# Patient Record
Sex: FEMALE | Race: BLACK OR AFRICAN AMERICAN | NOT HISPANIC OR LATINO
[De-identification: names, ages, dates, MRNs, and addresses within clinical notes are randomized per-mention and may not be internally consistent; named-entity substitution may affect disease eponyms.]

---

## 2017-01-04 ENCOUNTER — APPOINTMENT (OUTPATIENT)
Dept: RADIOLOGY | Facility: IMAGING CENTER | Age: 47
End: 2017-01-04

## 2017-01-04 ENCOUNTER — OUTPATIENT (OUTPATIENT)
Dept: OUTPATIENT SERVICES | Facility: HOSPITAL | Age: 47
LOS: 1 days | End: 2017-01-04
Payer: COMMERCIAL

## 2017-01-04 DIAGNOSIS — Z00.8 ENCOUNTER FOR OTHER GENERAL EXAMINATION: ICD-10-CM

## 2017-01-04 PROBLEM — Z00.00 ENCOUNTER FOR PREVENTIVE HEALTH EXAMINATION: Status: ACTIVE | Noted: 2017-01-04

## 2017-01-04 PROCEDURE — 71046 X-RAY EXAM CHEST 2 VIEWS: CPT

## 2018-09-24 ENCOUNTER — RESULT REVIEW (OUTPATIENT)
Age: 48
End: 2018-09-24

## 2020-01-14 ENCOUNTER — EMERGENCY (EMERGENCY)
Facility: HOSPITAL | Age: 50
LOS: 1 days | Discharge: HOME | End: 2020-01-14
Attending: EMERGENCY MEDICINE
Payer: COMMERCIAL

## 2020-01-14 VITALS
DIASTOLIC BLOOD PRESSURE: 76 MMHG | RESPIRATION RATE: 16 BRPM | SYSTOLIC BLOOD PRESSURE: 108 MMHG | OXYGEN SATURATION: 98 % | HEART RATE: 73 BPM | TEMPERATURE: 98 F

## 2020-01-14 VITALS
HEIGHT: 69 IN | OXYGEN SATURATION: 97 % | HEART RATE: 87 BPM | SYSTOLIC BLOOD PRESSURE: 118 MMHG | TEMPERATURE: 97 F | RESPIRATION RATE: 18 BRPM | WEIGHT: 195.11 LBS | DIASTOLIC BLOOD PRESSURE: 71 MMHG

## 2020-01-14 LAB
ALBUMIN SERPL ELPH-MCNC: 4.1 G/DL — SIGNIFICANT CHANGE UP (ref 3.3–5)
ALP SERPL-CCNC: 72 U/L — SIGNIFICANT CHANGE UP (ref 40–120)
ALT FLD-CCNC: 21 U/L — SIGNIFICANT CHANGE UP (ref 10–45)
ANION GAP SERPL CALC-SCNC: 14 MMOL/L — SIGNIFICANT CHANGE UP (ref 5–17)
APPEARANCE UR: CLEAR — SIGNIFICANT CHANGE UP
AST SERPL-CCNC: 21 U/L — SIGNIFICANT CHANGE UP (ref 10–40)
BACTERIA # UR AUTO: NEGATIVE — SIGNIFICANT CHANGE UP
BASOPHILS # BLD AUTO: 0.05 K/UL — SIGNIFICANT CHANGE UP (ref 0–0.2)
BASOPHILS NFR BLD AUTO: 0.6 % — SIGNIFICANT CHANGE UP (ref 0–2)
BILIRUB SERPL-MCNC: 0.2 MG/DL — SIGNIFICANT CHANGE UP (ref 0.2–1.2)
BILIRUB UR-MCNC: ABNORMAL
BUN SERPL-MCNC: 16 MG/DL — SIGNIFICANT CHANGE UP (ref 7–23)
CALCIUM SERPL-MCNC: 9.2 MG/DL — SIGNIFICANT CHANGE UP (ref 8.4–10.5)
CHLORIDE SERPL-SCNC: 100 MMOL/L — SIGNIFICANT CHANGE UP (ref 96–108)
CO2 SERPL-SCNC: 25 MMOL/L — SIGNIFICANT CHANGE UP (ref 22–31)
COLOR SPEC: ABNORMAL
CREAT SERPL-MCNC: 0.74 MG/DL — SIGNIFICANT CHANGE UP (ref 0.5–1.3)
DIFF PNL FLD: NEGATIVE — SIGNIFICANT CHANGE UP
EOSINOPHIL # BLD AUTO: 0.05 K/UL — SIGNIFICANT CHANGE UP (ref 0–0.5)
EOSINOPHIL NFR BLD AUTO: 0.6 % — SIGNIFICANT CHANGE UP (ref 0–6)
EPI CELLS # UR: 0 /HPF — SIGNIFICANT CHANGE UP
GAS PNL BLDV: SIGNIFICANT CHANGE UP
GLUCOSE SERPL-MCNC: 100 MG/DL — HIGH (ref 70–99)
GLUCOSE UR QL: NEGATIVE — SIGNIFICANT CHANGE UP
HCT VFR BLD CALC: 37.5 % — SIGNIFICANT CHANGE UP (ref 34.5–45)
HGB BLD-MCNC: 11.5 G/DL — SIGNIFICANT CHANGE UP (ref 11.5–15.5)
HYALINE CASTS # UR AUTO: 0 /LPF — SIGNIFICANT CHANGE UP (ref 0–2)
IMM GRANULOCYTES NFR BLD AUTO: 0.2 % — SIGNIFICANT CHANGE UP (ref 0–1.5)
KETONES UR-MCNC: NEGATIVE — SIGNIFICANT CHANGE UP
LEUKOCYTE ESTERASE UR-ACNC: ABNORMAL
LYMPHOCYTES # BLD AUTO: 3.71 K/UL — HIGH (ref 1–3.3)
LYMPHOCYTES # BLD AUTO: 43.9 % — SIGNIFICANT CHANGE UP (ref 13–44)
MCHC RBC-ENTMCNC: 26.5 PG — LOW (ref 27–34)
MCHC RBC-ENTMCNC: 30.7 GM/DL — LOW (ref 32–36)
MCV RBC AUTO: 86.4 FL — SIGNIFICANT CHANGE UP (ref 80–100)
MONOCYTES # BLD AUTO: 0.68 K/UL — SIGNIFICANT CHANGE UP (ref 0–0.9)
MONOCYTES NFR BLD AUTO: 8 % — SIGNIFICANT CHANGE UP (ref 2–14)
NEUTROPHILS # BLD AUTO: 3.95 K/UL — SIGNIFICANT CHANGE UP (ref 1.8–7.4)
NEUTROPHILS NFR BLD AUTO: 46.7 % — SIGNIFICANT CHANGE UP (ref 43–77)
NITRITE UR-MCNC: POSITIVE
NRBC # BLD: 0 /100 WBCS — SIGNIFICANT CHANGE UP (ref 0–0)
PH UR: 6 — SIGNIFICANT CHANGE UP (ref 5–8)
PLATELET # BLD AUTO: 266 K/UL — SIGNIFICANT CHANGE UP (ref 150–400)
POTASSIUM SERPL-MCNC: 4.1 MMOL/L — SIGNIFICANT CHANGE UP (ref 3.5–5.3)
POTASSIUM SERPL-SCNC: 4.1 MMOL/L — SIGNIFICANT CHANGE UP (ref 3.5–5.3)
PROT SERPL-MCNC: 7.1 G/DL — SIGNIFICANT CHANGE UP (ref 6–8.3)
PROT UR-MCNC: NEGATIVE — SIGNIFICANT CHANGE UP
RBC # BLD: 4.34 M/UL — SIGNIFICANT CHANGE UP (ref 3.8–5.2)
RBC # FLD: 14.8 % — HIGH (ref 10.3–14.5)
RBC CASTS # UR COMP ASSIST: 5 /HPF — HIGH (ref 0–4)
SODIUM SERPL-SCNC: 139 MMOL/L — SIGNIFICANT CHANGE UP (ref 135–145)
SP GR SPEC: 1.02 — SIGNIFICANT CHANGE UP (ref 1.01–1.02)
UROBILINOGEN FLD QL: ABNORMAL
WBC # BLD: 8.46 K/UL — SIGNIFICANT CHANGE UP (ref 3.8–10.5)
WBC # FLD AUTO: 8.46 K/UL — SIGNIFICANT CHANGE UP (ref 3.8–10.5)
WBC UR QL: 17 /HPF — HIGH (ref 0–5)

## 2020-01-14 PROCEDURE — 81001 URINALYSIS AUTO W/SCOPE: CPT

## 2020-01-14 PROCEDURE — 81025 URINE PREGNANCY TEST: CPT

## 2020-01-14 PROCEDURE — 87086 URINE CULTURE/COLONY COUNT: CPT

## 2020-01-14 PROCEDURE — 82435 ASSAY OF BLOOD CHLORIDE: CPT

## 2020-01-14 PROCEDURE — 82803 BLOOD GASES ANY COMBINATION: CPT

## 2020-01-14 PROCEDURE — 51702 INSERT TEMP BLADDER CATH: CPT

## 2020-01-14 PROCEDURE — 74177 CT ABD & PELVIS W/CONTRAST: CPT

## 2020-01-14 PROCEDURE — 87491 CHLMYD TRACH DNA AMP PROBE: CPT

## 2020-01-14 PROCEDURE — 82330 ASSAY OF CALCIUM: CPT

## 2020-01-14 PROCEDURE — 87040 BLOOD CULTURE FOR BACTERIA: CPT

## 2020-01-14 PROCEDURE — 83605 ASSAY OF LACTIC ACID: CPT

## 2020-01-14 PROCEDURE — 82947 ASSAY GLUCOSE BLOOD QUANT: CPT

## 2020-01-14 PROCEDURE — 87591 N.GONORRHOEAE DNA AMP PROB: CPT

## 2020-01-14 PROCEDURE — 85027 COMPLETE CBC AUTOMATED: CPT

## 2020-01-14 PROCEDURE — 99284 EMERGENCY DEPT VISIT MOD MDM: CPT | Mod: 25

## 2020-01-14 PROCEDURE — 84295 ASSAY OF SERUM SODIUM: CPT

## 2020-01-14 PROCEDURE — 84132 ASSAY OF SERUM POTASSIUM: CPT

## 2020-01-14 PROCEDURE — 80053 COMPREHEN METABOLIC PANEL: CPT

## 2020-01-14 PROCEDURE — 74177 CT ABD & PELVIS W/CONTRAST: CPT | Mod: 26

## 2020-01-14 PROCEDURE — 99285 EMERGENCY DEPT VISIT HI MDM: CPT

## 2020-01-14 PROCEDURE — 85014 HEMATOCRIT: CPT

## 2020-01-14 RX ORDER — CEFPODOXIME PROXETIL 100 MG
1 TABLET ORAL
Qty: 20 | Refills: 0
Start: 2020-01-14 | End: 2020-01-23

## 2020-01-14 RX ORDER — PHENAZOPYRIDINE HCL 100 MG
100 TABLET ORAL ONCE
Refills: 0 | Status: COMPLETED | OUTPATIENT
Start: 2020-01-14 | End: 2020-01-14

## 2020-01-14 RX ORDER — CEFPODOXIME PROXETIL 100 MG
200 TABLET ORAL ONCE
Refills: 0 | Status: COMPLETED | OUTPATIENT
Start: 2020-01-14 | End: 2020-01-14

## 2020-01-14 RX ADMIN — Medication 200 MILLIGRAM(S): at 16:58

## 2020-01-14 RX ADMIN — Medication 100 MILLIGRAM(S): at 15:50

## 2020-01-14 RX ADMIN — Medication 100 MILLIGRAM(S): at 06:53

## 2020-01-14 NOTE — ED PROVIDER NOTE - PROGRESS NOTE DETAILS
Admits to suprapubic pain x3 months, worsened last night. UA +, pending UCX. Admits to persistent bubbles in urine, concerning for vesicovaginal fistual; voiding cystourethrogram ordered; urology consulted Olga Lidia Cotter M.D. Resident  Patient signed out to me pending voiding cystourethrogram. Spoke to rads resident regarding test; rads resident will call back. Olga Lidia Cotter M.D. Resident  Spoke to urology regarding CT pelvis Cystogram, will give recs after pt is seen. Olga Lidia Cotter M.D. Resident  Urology attending will see patient. endorsed to me by ray. still waiting for voiding cystogram. pt has decided she will go ahead w dayami Griffin MD- Olga Lidia Cotter M.D. Resident  Urology has low suspicion for fistula, recommends outpatient follow up with no further testing at this time. Pt very distressed about symptoms, offered Xray voiding urethrogram. Pt needs more time to decide if she would like to proceed with xray urethrogram. Olga Lidia Cotter M.D. Resident  Patient declined xray urethrogram stating it is used to r/o reflux. CT cystogram ordered. called radiology to expedite. Olga Lidia Cotter M.D. Resident  Patient declined xray urethrogram stating it is used to r/o reflux. CT cystogram ordered. called radiology to expedite. Instructed to call CT tech to expedite. Olga Lidia Cotter M.D. Resident  Patient in room getting drake cath. ED CT called to expedite (with clearance from radiology resident Roya). CT said they will try to get done asap. Olga Lidia Cotter M.D. Resident  Spoke to CT scan, cardiac room is busy and no room in upstairs CT scan. will take PT as soon as possible when cardiac room is open. pt endorsed to me by dr ray whalen md. dw her that there were many delays due to original plan of xr cystogram - changed to ct urethrogram. uro did state that it was poss to do this outpt  but she wants it done now. delay still due to need for an open table CT for lenghty test. she understands this will require further waiting. will endorse to shital figueroa. -Joe Griffin MD- Olga Lidia Cotter M.D. Resident  results reviewed with patient. Patient agreeable to follow up with urology and home on cefpodoxime. Rx sent. written and verbal discharge instructions and return precautions given to patient. Olga Lidia Cotter M.D. Resident  Patient draek removed and pt was able to void post drake. Pt agreeable to going home.

## 2020-01-14 NOTE — ED PROVIDER NOTE - NSFOLLOWUPCLINICS_GEN_ALL_ED_FT
[facility]  Urology  [address], [address]  [place] [address]  Phone: [phone]  Fax:   Follow Up Time: 4-6 Days    [facility]  Urology  [address], [address]rd [place] [address]  Phone: [phone]  Fax:   Follow Up Time: 4-6 Days    [facility] - Urology  Urology  [address], 3rd & [address]th [place] [address]  Phone: [phone]  Fax:   Follow Up Time: 4-6 Days    [facility]  Urology  [address] [place] [address]  Phone: [phone]  Fax:   Follow Up Time: 4-6 Days    [facility]  Urology  [address] [place] [address]  Phone: [phone]  Fax: [phone]  Follow Up Time: 4-6 Days    [facility]  Urology  [address] [place] [address]  Phone: [phone]  Fax:   Follow Up Time: 4-6 Days

## 2020-01-14 NOTE — CONSULT NOTE ADULT - SUBJECTIVE AND OBJECTIVE BOX
HPI:    Patient is a 49 y.o F w/ PMH ovarian ca (dx age 21) s/p total hysterectomy and chemotherapy who presents w/ increasing suprapubic pain and increased urinary frequency x3 months. She admits to "bubbles" in her urine and passing air per urethra. Her symptoms are partially relieved w/ pyridium however she came to ER because the pyridium was not helping any longer.  She denies fever/chills, hematuria. She was dx w/ UTI in October and had symptoms of dysuria, increased urinary frequency, and surapubic pain. She was treated w/ macrobid and her dysuria resolved. However her suprapubic pain and urinary frequency persisted. She presented to her PMD and gynecology who found her subsequent urine cultures to be negative and no signs of prolapse. Pelvic exams x 2 were negative as was a pelvic sono.  She was referred to uro-gyn and scheduled to have an appt in February. However, her pain worsened yesterday which brought her to the ED. She denies constipation of diarrhea.  	          PAST MEDICAL & SURGICAL HISTORY:  Ovarian cancer    MEDICATIONS  (STANDING):  none    MEDICATIONS  (PRN): Pyridium    FAMILY HISTORY:    Allergies    penicillin (Hives; Pruritus)    Intolerances      SOCIAL HISTORY:   Tobacco hx: unknown      REVIEW OF SYSTEMS: Pertinent positives and negatives as stated in HPI, otherwise negative    Vital signs  T(C): 36.7 (20 @ 07:20), Max: 36.7 (20 @ 07:20)  HR: 75 (20 @ 07:20)  BP: 110/73 (20 @ 07:20)  SpO2: 97% (20 @ 07:20)  Wt(kg): --    Output    UOP    Physical Exam  Gen: NAD  Pulm: No respiratory distress, no subcostal retractions  CV: RRR, no JVD  Abd: Soft, NT, ND  Back: No CVAT   MSK: No edema present    LABS:           @ 05:51    WBC 8.46  / Hct 37.5  / SCr 0.74         139  |  100  |  16  ----------------------------<  100<H>  4.1   |  25  |  0.74    Ca    9.2      2020 05:51    TPro  7.1  /  Alb  4.1  /  TBili  0.2  /  DBili  x   /  AST  21  /  ALT  21  /  AlkPhos  72  01-14      Urinalysis Basic - ( 2020 04:12 )    Color: Dark Orange / Appearance: Clear / S.022 / pH: x  Gluc: x / Ketone: Negative  / Bili: Small / Urobili: 4 mg/dL   Blood: x / Protein: Negative / Nitrite: Positive   Leuk Esterase: Large / RBC: 5 /hpf / WBC 17 /HPF   Sq Epi: x / Non Sq Epi: 0 /hpf / Bacteria: Negative      RADIOLOGY:

## 2020-01-14 NOTE — ED ADULT NURSE REASSESSMENT NOTE - NS ED NURSE REASSESS COMMENT FT1
Addendum note for 12:45pm:  Pt for cystogram, as per Dr. Cotter, pt needs Rose catheter prior to cystogram.  Pt was informed by MD, pt requesting small size Rose 12 Sudanese.  Dr. Cotter was informed and pt okay to have 12 Sierra Leonean size.  Rose catheter 12 Sierra Leonean inserted using sterile technique, pt tolerated well.   Pt draining yellow urine.

## 2020-01-14 NOTE — ED PROVIDER NOTE - NSFOLLOWUPINSTRUCTIONS_ED_ALL_ED_FT
You were seen in the Emergency Department (ED) for persistent dysuria and concern for fistula. Your CT was negative for fistula.     You are prescribed Cefpodoxime. Please take as directed by your pharmacist.    Please follow up with urology Dr. Live (call  to make an appointment).     Please return to the ED if you experience any new or concerning symptoms, such as: worsening dysuria, abdominal pain, CVA tenderness, fever, chills.     Thank you for choosing a [facility] ED.

## 2020-01-14 NOTE — ED PROVIDER NOTE - OBJECTIVE STATEMENT
Patient is a 49 y.o F w/ PMH Patient is a 49 y.o F w/ [facility] kidney stones who was sent by her Urologist for a fever of 101.4. She denies any hematuria, dysuria, or change in urinary frequency. Patient states that she had a kidney stone three weeks ago s/p ureteral stent. She underwent a stent removal by her Urologist, Dr. Smith, yesterday (1/13). She denies any complications and was sent home. She subsequently experienced fever/chills and called her Urologist who sent her to the ED. She denies any cough, CP, SOB, abd pain, diarrhea, or skin rashes. Patient is a 49 y.o F w/ PMH ovarian ca (dx age 21) s/p total hysterectomy and chemotherapy who presents w/ suprapubic pain and increased urinary frequency x3 months. She admits to "bubbles" in her urine. Her symptoms are partially relieved w/ pyridium.  She denies fever/chills, hematuria. She states that in November, she was dx w/ UTI and had symptoms of dysuria, increased urinary frequency, and surapubic pain. She was treated w/ macrobid and her dysuria resolved. her suprapubic pain and urinary frequency persisted. She presented to her PMD and gynecology who found her subsequent urine cultures to be negative and no signs of prolapse. She was referred to uro-gyn and scheduled to have an apopitnment in February. However, her pain worsened yesterday which brought her to the ED.

## 2020-01-14 NOTE — ED ADULT NURSE REASSESSMENT NOTE - NS ED NURSE REASSESS COMMENT FT1
Pt is breathing unlabored on RA. VSS. Educated pt on plan of care. Safety and comfort maintained. MD Anderson at pt bedside discussing plan of care.

## 2020-01-14 NOTE — ED ADULT TRIAGE NOTE - CHIEF COMPLAINT QUOTE
pelvic pain intermittent since October; rx for UTI; has urinary frequency; has been unable to f/u with urogynecologist

## 2020-01-14 NOTE — ED ADULT NURSE REASSESSMENT NOTE - NS ED NURSE REASSESS COMMENT FT1
Addendum note:  Rose discontinued/ removed at about 17:10pm as per Dr. Cotter.  Pt ambulatory, pt voiding urine.  MD informed.

## 2020-01-14 NOTE — ED ADULT NURSE REASSESSMENT NOTE - NS ED NURSE REASSESS COMMENT FT1
Received report from night nurse Shagufta Gresham.  Pt resting in stretcher.  No acute respiratory distress noted, v/s obtained.

## 2020-01-14 NOTE — ED PROVIDER NOTE - PHYSICAL EXAMINATION
PHYSICAL EXAM:  GENERAL: NAD, well-developed  HEAD:  Atraumatic, Normocephalic  EYES: EOMI, PERRLA, conjunctiva and sclera clear  NECK: Supple, No JVD  CHEST/LUNG: Clear to auscultation bilaterally; No wheeze  HEART: Regular rate and rhythm; No murmurs, rubs, or gallops  ABDOMEN: no suprapubic tenderness, Soft, Nontender, Nondistended; Bowel sounds present  EXTREMITIES:  2+ Peripheral Pulses, No clubbing, cyanosis, or edema  PSYCH: AAOx3  NEUROLOGY: non-focal  SKIN: No rashes or lesions

## 2020-01-14 NOTE — CONSULT NOTE ADULT - ASSESSMENT
Pelvic pain, air in urine  - send urine culture  - + nitrite in UA likely due to patient taking pyridium   - Unclear source of pelvic pain, fistula unlikely, would defer to Urogyn, and pain control as needed ( i.e. Motrin)  - Can have imaging/ workup of pneumaturia as outpatient  - follow up with UroGYN  - can f/u with Urology Dr. Live [phone]

## 2020-01-14 NOTE — ED PROVIDER NOTE - NS ED ROS FT
GENERAL: No fever, chills  EYES: no vision changes, no discharge.   HEENT: no difficulty swallowing or speaking   CARDIAC: no chest pain/pressure, SOB, lower ex edema  PULMONARY: no cough, SOB  GI: no abdominal pain, n/v/d  : + dysuria, + urinary frequency, no hematuria + bubbles in urine  SKIN: no rashes  NEURO: no headache, lightheadedness, paresthesia  MSK: No joint pain, myalgia, weakness.

## 2020-01-14 NOTE — ED PROVIDER NOTE - CLINICAL SUMMARY MEDICAL DECISION MAKING FREE TEXT BOX
Patient is a 49 y.o F w/ PMH ovarian ca (dx age 21) s/p total hysterectomy and chemotherapy who presents w/ suprapubic pain and increased urinary frequency x3 months. She also admits to "bubbles" in her urine, concerning for vesicovaginal fistula. UA + but per pt, UCX consistently negative in past despite positive UAs. Urology consulted Patient is a 49 y.o F w/ PMH ovarian ca (dx age 21) s/p total hysterectomy and chemotherapy who presents w/ suprapubic pain and increased urinary frequency x3 months. She also admits to "bubbles" in her urine, concerning for vesicovaginal fistula. UA + but per pt, UCX consistently negative in past despite positive UAs. Urology consulted    KALIN Anderson MD: Agree with statement above. Ddx includes, however, is not limited to: rectovaginal fistula, interstitial cystitis, uti, other. Plan: basic labs, u/a, urology consult. Pt has a f/u appt with Urogyn almost 2 months from now, will initiate w/u today

## 2020-01-14 NOTE — ED ADULT NURSE NOTE - OBJECTIVE STATEMENT
Pt is 49 Y A&O x3 F with no PMH presenting to ED with c/o intermittent pelvic pain since October that has gotten progressively worse over past few days. Pt reports she was treated for UTI in October, pain has not resolved. Pt reports she has been taking Tylenol/Motrin with no relief.  Pt reports she has been taking Pyridium prescribed by PCP with some relief. Pt states she has appointment in February to f/u with urogynecologist. Pt endorses urinary frequency, denies burning upon urination. Pt denies fevers, chills, CP, SOB. Pt arrives to ED breathing unlabored on RA. Abd soft, non-distended, non-tender. Skin is warm and dry. Pt ambulatory with steady gait. Safety and comfort maintained. Awaiting MD evaluation.

## 2020-01-14 NOTE — ED PROVIDER NOTE - PATIENT PORTAL LINK FT
You can access the FollowMyHealth Patient Portal offered by [facility] by registering at the following website: [email or web address]. By joining [email or web address] FollowMyHealth portal, you will also be able to view your health information using other applications (apps) compatible with our system.

## 2020-01-15 LAB
C TRACH RRNA SPEC QL NAA+PROBE: SIGNIFICANT CHANGE UP
CULTURE RESULTS: SIGNIFICANT CHANGE UP
N GONORRHOEA RRNA SPEC QL NAA+PROBE: SIGNIFICANT CHANGE UP
SPECIMEN SOURCE: SIGNIFICANT CHANGE UP
SPECIMEN SOURCE: SIGNIFICANT CHANGE UP

## 2020-01-19 LAB
CULTURE RESULTS: SIGNIFICANT CHANGE UP
CULTURE RESULTS: SIGNIFICANT CHANGE UP
SPECIMEN SOURCE: SIGNIFICANT CHANGE UP
SPECIMEN SOURCE: SIGNIFICANT CHANGE UP

## 2020-01-29 ENCOUNTER — APPOINTMENT (OUTPATIENT)
Dept: UROGYNECOLOGY | Facility: CLINIC | Age: 50
End: 2020-01-29
Payer: COMMERCIAL

## 2020-01-29 VITALS
HEIGHT: 68 IN | SYSTOLIC BLOOD PRESSURE: 119 MMHG | BODY MASS INDEX: 29.1 KG/M2 | DIASTOLIC BLOOD PRESSURE: 77 MMHG | WEIGHT: 192 LBS

## 2020-01-29 DIAGNOSIS — Z83.3 FAMILY HISTORY OF DIABETES MELLITUS: ICD-10-CM

## 2020-01-29 DIAGNOSIS — Z85.40 PERSONAL HISTORY OF MALIGNANT NEOPLASM OF UNSPECIFIED FEMALE GENITAL ORGAN: ICD-10-CM

## 2020-01-29 DIAGNOSIS — Z78.9 OTHER SPECIFIED HEALTH STATUS: ICD-10-CM

## 2020-01-29 DIAGNOSIS — Z87.19 PERSONAL HISTORY OF OTHER DISEASES OF THE DIGESTIVE SYSTEM: ICD-10-CM

## 2020-01-29 DIAGNOSIS — Z80.3 FAMILY HISTORY OF MALIGNANT NEOPLASM OF BREAST: ICD-10-CM

## 2020-01-29 DIAGNOSIS — Z85.43 PERSONAL HISTORY OF MALIGNANT NEOPLASM OF OVARY: ICD-10-CM

## 2020-01-29 DIAGNOSIS — Z82.69 FAMILY HISTORY OF OTHER DISEASES OF THE MUSCULOSKELETAL SYSTEM AND CONNECTIVE TISSUE: ICD-10-CM

## 2020-01-29 DIAGNOSIS — Z87.2 PERSONAL HISTORY OF DISEASES OF THE SKIN AND SUBCUTANEOUS TISSUE: ICD-10-CM

## 2020-01-29 LAB
BILIRUB UR QL STRIP: NEGATIVE
CLARITY UR: CLEAR
COLLECTION METHOD: NORMAL
GLUCOSE UR-MCNC: NEGATIVE
HCG UR QL: 0.2 EU/DL
HGB UR QL STRIP.AUTO: NEGATIVE
KETONES UR-MCNC: NEGATIVE
LEUKOCYTE ESTERASE UR QL STRIP: NORMAL
NITRITE UR QL STRIP: NEGATIVE
PH UR STRIP: 6.5
PROT UR STRIP-MCNC: NEGATIVE
SP GR UR STRIP: 1.01

## 2020-01-29 PROCEDURE — 81003 URINALYSIS AUTO W/O SCOPE: CPT | Mod: QW

## 2020-01-29 PROCEDURE — 99204 OFFICE O/P NEW MOD 45 MIN: CPT | Mod: 25

## 2020-01-29 PROCEDURE — 51701 INSERT BLADDER CATHETER: CPT

## 2020-01-29 RX ORDER — MULTIVITAMIN
TABLET ORAL
Refills: 0 | Status: ACTIVE | COMMUNITY

## 2020-01-29 RX ORDER — PHENAZOPYRIDINE 100 MG/1
100 TABLET, FILM COATED ORAL
Refills: 0 | Status: ACTIVE | COMMUNITY

## 2020-01-29 RX ORDER — ACETAMINOPHEN 325 MG/1
TABLET, FILM COATED ORAL
Refills: 0 | Status: ACTIVE | COMMUNITY

## 2020-01-29 RX ORDER — PSYLLIUM HUSK 0.4 G
CAPSULE ORAL
Refills: 0 | Status: ACTIVE | COMMUNITY

## 2020-01-29 RX ORDER — IBUPROFEN 800 MG
800 TABLET ORAL
Refills: 0 | Status: ACTIVE | COMMUNITY

## 2020-01-29 NOTE — HISTORY OF PRESENT ILLNESS
[FreeTextEntry1] : Since 1 UCx-proven UTI in 10/2019, symptoms of urinary freq urge and nocturia, "pelvic" ie suprapubic pain, and vaginal pain which she reports more of flares and spasms/tightness and vaginal pain in relation to pelvic exams that have been occurring in past few months. Gross hematuria once during the initial 10/19 UTIs. UCxs tested thereafter she reports neg, however persistant symptoms of urge freq nocturia. No specific dysuria. No flank pain. No fever/chills, no vag discharge or bleeding, not sexually active, nnormal bowel movements without straining or constipatin with current fiber regimen. Has had multiple Ucxs recently that are neg. Feels passage of gas/air per urine as well, not per vagina, no leakage vaginally, no vag bulge or pressure or discharge, no brown urine passage. Pyridium used, only thing sthat helps slightly. \par \par Plevic exams recently very painful, cannot tolerate. \par \par Hx SLE\par Hx ovar cancer age 21, s/p ZAIDA/BSO and chemo (no RT), was on oral HRT tehreafter until about 10 years ago, never on vag estrogen topically, has been cleared and discmissed from her gyn oncologist. \par \par CT AP with contrast 1/202 report brought in - normal kidneys and bladder, no enterovesical fistula noted, no pathology.

## 2020-01-29 NOTE — ASSESSMENT
[FreeTextEntry1] : Janet is a pleasant 50 yo G0, PMhx/PSHx includes ZAIDA/BSO age 21 for ovarian cancer, s/p chemo (no RT), s/p oral HRT for years until about 10 years ago, now presents with OAB-dry symptoms, vaginal pain, and symptoms of pneumaturia, recent CT A/P neg for path findings. On exam, her empty supine CST was neg, and she did not have positive urethral hypermobility. Her straight-cath PVR was checked 1 hour post-void and was 100 ml, the dip was sent for cytology. Pelvic exam was limited due to pain from likely excessive atrophic vaginal changes - the introitus was narrowed, erythematous without lesions or messes, and tender to soft touch with finger and q-tip. Small speculum exam not attempted due to tenderness. 1 finger digitual exam was performed, able to insert 1 finger about 1/3 into distal vagina, likely bilateral levator tenderness and thickening however exam limited at that point. There wre no appreiable cysts, porlapse, or suburethral diverticulum on distal vaginal exam. \par \par We discussed need for cysto despite neg CT for enterovesical fistula,d ue to symptom of pnematuria, and recnet oncent of urge freq nocturia despite neg ucxs. She understood and agreed. Considering how difficult it is for her to tolerate external vaginal exam alone, I doubt she can undergo office diagnostic cysto. In setting of need for biopsy or further vesicular surgical intervention if any pathology found would be per urology, I recommend she see urology for the cysto in order to minimize the potential for repeat cysto.\par \par My vaginal exam was limited, but she has had the g yn exams without findings recently. She definitely has atrophic vaginal changes, with narrowed introitus, not allowing for touch and unlikely to be able to undergo further interventions such as pelvic floor PT in its curent state. I recommend she see a Gyn Onc for clearance for use of topical vaginal estrogen. We discussed OTC vag moisturizer as well. We discussed vulvodynia and pelvic floor tension myalgia however I am uncertain to what degrees these are present on limited exam, and I feel management optinos which were briefly discussed and written down would be limited by atrophic changes (such as vaginal PT), intravaginal suppositories or creams. She will pursue urology and gyn onc first, and then reevalulate. All ques answered. F/U as needed.\par \par Plan:\par [] f/u urology - cysto - eval for enterovesicular fistula prn\par [] f/u urine cytol\par [] f/u with gyn onc for topical vaginal estrogen clearance --> atrophy management --> assess and address vulvodynia and/or pelvic floor tension myalgia\par

## 2020-01-29 NOTE — PHYSICAL EXAM
[Oriented x3] : oriented to person, place, and time [No Acute Distress] : in no acute distress [No Edema] : ~T edema was not present [Symmetrical] : the neck was ~L symmetrical [None] : no CVA tenderness [Soft, Nontender] : the abdomen was soft and nontender [Warm and Dry] : was warm and dry to touch [Normal Gait] : gait was normal [Labia Minora] : were normal [Labia Majora] : were normal [Bartholin's Gland] : both Bartholin's glands were normal  [Atrophy] : atrophy [No Bleeding] : there was no active vaginal bleeding [Normal] : was normal [Aa ____] : Aa [unfilled] [Ba ____] : Ba [unfilled] [GH ____] : GH [unfilled] [PB ____] : PB [unfilled] [TVL ____] : TVL  [unfilled] [Ap ____] : Ap [unfilled] [Bp ____] : Bp [unfilled] [Post Void Residual ____ml] : post void residual via catheterization was [unfilled] ml [Exam Deferred] : was deferred [Cough] : no cough [Tenderness] : ~T no ~M abdominal tenderness observed [Distended] : not distended [Inguinal LAD] : no adenopathy was noted in the inguinal lymph nodes [FreeTextEntry4] : external visualization normal. q-tip +tenderness to touch vestibule entirely. cannot tolerate narrow speculum exam. 1 digital finger exam no path noted about 1/3 depth of distal vag vault, ? unclear if bilateral levators tender or thickened, extremely painful for patient therefore exam discontinued at this time [FreeTextEntry3] : extremely atrophic, retracted, erythematous but no lesion / mass [de-identified] : no POP [de-identified] : cath painful per patient

## 2020-01-29 NOTE — OB HISTORY
[Total Preg ___] : : [unfilled] [Approximately ___ (Month)] : the LMP was approximately [unfilled] month(s) ago [Sexually Active] : is not sexually active

## 2020-01-29 NOTE — PROCEDURE
[Straight Catheterization] : insertion of a straight catheter [Hematuria] : hematuria [Urinary Tract Infection] : a urinary tract infection [Urinary Frequency] : urinary frequency [Patient] : the patient [___ Fr Straight Tip] : a [unfilled] in Spanish straight tip catheter [None] : none [Clear] : clear [Cytology] : cytology [No Complications] : no complications [Tolerated Well] : the patient tolerated the procedure well [Post procedure instructions and information given] : Post procedure instructions and information were given and reviewed with patient. [3] : 3 [FreeTextEntry1] : cath dip neg - cathed for PVR and uncontam specimen

## 2020-01-30 ENCOUNTER — RESULT REVIEW (OUTPATIENT)
Age: 50
End: 2020-01-30

## 2020-01-30 LAB
APPEARANCE: CLEAR
BACTERIA: NEGATIVE
BILIRUBIN URINE: NEGATIVE
BLOOD URINE: NEGATIVE
COLOR: YELLOW
GLUCOSE QUALITATIVE U: NEGATIVE
HYALINE CASTS: 0 /LPF
KETONES URINE: NEGATIVE
LEUKOCYTE ESTERASE URINE: NEGATIVE
MICROSCOPIC-UA: NORMAL
NITRITE URINE: NEGATIVE
PH URINE: 7.5
PROTEIN URINE: NEGATIVE
RED BLOOD CELLS URINE: 0 /HPF
SPECIFIC GRAVITY URINE: 1.01
SQUAMOUS EPITHELIAL CELLS: 0 /HPF
URINE CYTOLOGY: NORMAL
UROBILINOGEN URINE: NORMAL
WHITE BLOOD CELLS URINE: 0 /HPF

## 2020-01-31 LAB — BACTERIA UR CULT: NORMAL

## 2020-02-10 ENCOUNTER — APPOINTMENT (OUTPATIENT)
Dept: UROGYNECOLOGY | Facility: CLINIC | Age: 50
End: 2020-02-10
Payer: COMMERCIAL

## 2020-02-10 DIAGNOSIS — N95.2 POSTMENOPAUSAL ATROPHIC VAGINITIS: ICD-10-CM

## 2020-02-10 DIAGNOSIS — R39.15 URGENCY OF URINATION: ICD-10-CM

## 2020-02-10 PROBLEM — C56.9 MALIGNANT NEOPLASM OF UNSPECIFIED OVARY: Chronic | Status: ACTIVE | Noted: 2020-01-14

## 2020-02-10 PROCEDURE — 99215 OFFICE O/P EST HI 40 MIN: CPT

## 2020-02-10 RX ORDER — AMITRIPTYLINE HYDROCHLORIDE 10 MG/1
10 TABLET, FILM COATED ORAL
Qty: 30 | Refills: 3 | Status: ACTIVE | COMMUNITY
Start: 2020-02-10 | End: 1900-01-01

## 2020-02-10 RX ORDER — ESTRADIOL 10 UG/1
10 TABLET, FILM COATED VAGINAL
Qty: 24 | Refills: 3 | Status: ACTIVE | COMMUNITY
Start: 2020-02-10 | End: 1900-01-01

## 2020-02-10 NOTE — HISTORY OF PRESENT ILLNESS
[FreeTextEntry1] : \jodi Gonzales presents to my office after seeing Dr Montano with symptoms of urinary frequency, urgency, nocturia, pneumaturia, pelvic pain which started after having a UTI in October. She reports pain is burning sensation and occurs daily. She reports at time it is severe and feels as though it is "flaring". She has had some relief with pyridium in past. She also reports severe vaginal pain in relation to pelvic exams that have been occurring in past few months. During today's visit we discussed performing pelvic examination to further evaluate her symptoms. She became very upset and asked for some time to think about whether or not she agrees to examination today. I counseled her that an examination was necessary as part of her evaluation and that I don’t think I can give her my opinion or assess her complaints without examining her. She is unsure if she would like to proceed. She expressed frustration as she has seen GYN, Urogyn and urology (In ER) and feels that her concerns and symptoms have not been adequately addressed. After 10 minutes of consideration, she agreed to exam. \par \par Hx SLE\par Hx ovar cancer age 21, s/p ZAIDA/BSO and chemo (no RT), was on oral HRT until about 10 years ago, never on vag estrogen topically\par \par CT AP with contrast 1/2020- normal kidneys and bladder, no enterovesical fistula noted, no pathology.

## 2020-02-10 NOTE — PHYSICAL EXAM
[No Acute Distress] : in no acute distress [Normal Memory] : ~T memory was ~L unimpaired [Oriented x3] : oriented to person, place, and time [Normal Mood/Affect] : mood and affect are normal [Normal Gait] : gait was normal [Warm and Dry] : was warm and dry to touch [Vulvar Atrophy] : vulvar atrophy [Labia Minora] : were normal [Labia Majora] : were normal [Normal] : was normal [Atrophy] : atrophy [Dry Mucosa] : dry mucosa [No Bleeding] : there was no active vaginal bleeding [Absent] : absent [Exam Deferred] : was deferred [Tenderness] : ~T no ~M abdominal tenderness observed [Distended] : not distended [Inguinal LAD] : no adenopathy was noted in the inguinal lymph nodes [H/Smegaly] : no hepatosplenomegaly [de-identified] : tenderness on exam, no masses palpated [FreeTextEntry4] : severe atrophy

## 2020-02-10 NOTE — DISCUSSION/SUMMARY
[Face to Face Time___ Minutes] : with [unfilled] minutes in face to face consultation. [FreeTextEntry1] : \par -Cystourethroscopy\par -Start bladder sensitive diet, written guidelines provided\par -Start Amitriptyline 10 mg QHS. Risks and side effects reviewed. If no improvement after 2 weeks, increase to 20 mg QHS\par -Start low dose vaginal estrogen. Risks reviewed. We reviewed available data on low dose vaginal estrogen. Recommend she start Vagifem 10 mcg QHS x 2 weeks then biw\par -Recommend consultation with Fatuma Pop, contact information provided. Possible PFD, IC/BPS.

## 2020-03-06 ENCOUNTER — APPOINTMENT (OUTPATIENT)
Dept: UROLOGY | Facility: CLINIC | Age: 50
End: 2020-03-06
Payer: COMMERCIAL

## 2020-03-06 VITALS
OXYGEN SATURATION: 97 % | TEMPERATURE: 98.1 F | HEART RATE: 78 BPM | BODY MASS INDEX: 29.1 KG/M2 | SYSTOLIC BLOOD PRESSURE: 109 MMHG | DIASTOLIC BLOOD PRESSURE: 77 MMHG | WEIGHT: 192 LBS | RESPIRATION RATE: 14 BRPM | HEIGHT: 68 IN

## 2020-03-06 DIAGNOSIS — R35.1 NOCTURIA: ICD-10-CM

## 2020-03-06 DIAGNOSIS — N94.819 VULVODYNIA, UNSPECIFIED: ICD-10-CM

## 2020-03-06 DIAGNOSIS — R10.2 PELVIC AND PERINEAL PAIN: ICD-10-CM

## 2020-03-06 DIAGNOSIS — M62.89 OTHER SPECIFIED DISORDERS OF MUSCLE: ICD-10-CM

## 2020-03-06 DIAGNOSIS — M35.00 SICCA SYNDROME, UNSPECIFIED: ICD-10-CM

## 2020-03-06 DIAGNOSIS — M79.18 MYALGIA, OTHER SITE: ICD-10-CM

## 2020-03-06 PROCEDURE — 99205 OFFICE O/P NEW HI 60 MIN: CPT

## 2020-03-09 PROBLEM — R10.2 PELVIC PAIN: Status: ACTIVE | Noted: 2020-01-29

## 2020-03-09 PROBLEM — M79.18 MYALGIA OF PELVIC FLOOR: Status: ACTIVE | Noted: 2020-03-09

## 2020-03-09 PROBLEM — R35.1 NOCTURIA: Status: ACTIVE | Noted: 2020-01-29

## 2020-03-09 PROBLEM — N94.819 VULVODYNIA: Status: ACTIVE | Noted: 2020-03-09

## 2020-03-09 PROBLEM — M62.89 PELVIC FLOOR DYSFUNCTION: Status: ACTIVE | Noted: 2020-03-09

## 2020-03-09 PROBLEM — M35.00 SJOGRENS SYNDROME: Status: ACTIVE | Noted: 2020-03-09

## 2020-03-18 ENCOUNTER — OUTPATIENT (OUTPATIENT)
Dept: OUTPATIENT SERVICES | Facility: HOSPITAL | Age: 50
LOS: 1 days | End: 2020-03-18
Payer: COMMERCIAL

## 2020-03-18 ENCOUNTER — APPOINTMENT (OUTPATIENT)
Dept: UROGYNECOLOGY | Facility: CLINIC | Age: 50
End: 2020-03-18
Payer: COMMERCIAL

## 2020-03-18 DIAGNOSIS — R35.0 FREQUENCY OF MICTURITION: ICD-10-CM

## 2020-03-18 DIAGNOSIS — R39.15 URGENCY OF URINATION: ICD-10-CM

## 2020-03-18 DIAGNOSIS — R39.89 OTHER SYMPTOMS AND SIGNS INVOLVING THE GENITOURINARY SYSTEM: ICD-10-CM

## 2020-03-18 PROCEDURE — 52000 CYSTOURETHROSCOPY: CPT

## 2020-03-23 DIAGNOSIS — Z01.818 ENCOUNTER FOR OTHER PREPROCEDURAL EXAMINATION: ICD-10-CM

## 2020-05-27 ENCOUNTER — APPOINTMENT (OUTPATIENT)
Dept: RHEUMATOLOGY | Facility: CLINIC | Age: 50
End: 2020-05-27

## 2020-06-24 ENCOUNTER — APPOINTMENT (OUTPATIENT)
Dept: UROLOGY | Facility: CLINIC | Age: 50
End: 2020-06-24

## 2020-07-29 ENCOUNTER — APPOINTMENT (OUTPATIENT)
Dept: RHEUMATOLOGY | Facility: CLINIC | Age: 50
End: 2020-07-29
Payer: COMMERCIAL

## 2020-07-29 ENCOUNTER — LABORATORY RESULT (OUTPATIENT)
Age: 50
End: 2020-07-29

## 2020-07-29 VITALS
HEIGHT: 68 IN | DIASTOLIC BLOOD PRESSURE: 78 MMHG | BODY MASS INDEX: 30.31 KG/M2 | SYSTOLIC BLOOD PRESSURE: 116 MMHG | WEIGHT: 200 LBS | TEMPERATURE: 98.1 F | HEART RATE: 64 BPM | OXYGEN SATURATION: 98 %

## 2020-07-29 DIAGNOSIS — Z82.61 FAMILY HISTORY OF ARTHRITIS: ICD-10-CM

## 2020-07-29 DIAGNOSIS — Z87.19 PERSONAL HISTORY OF OTHER DISEASES OF THE DIGESTIVE SYSTEM: ICD-10-CM

## 2020-07-29 DIAGNOSIS — Z82.69 FAMILY HISTORY OF OTHER DISEASES OF THE MUSCULOSKELETAL SYSTEM AND CONNECTIVE TISSUE: ICD-10-CM

## 2020-07-29 PROCEDURE — 99204 OFFICE O/P NEW MOD 45 MIN: CPT

## 2020-07-29 NOTE — HISTORY OF PRESENT ILLNESS
[Currently Experiencing] : currently [Fatigue] : fatigue [Dry Mouth] : dry mouth [Arthralgias] : arthralgias [Decreased ROM] : decreased range of motion [Myalgias] : myalgias [Dry Eyes] : dry eyes [FreeTextEntry1] : 50 year old female here for evaluation of Sjogren's syndrome \par \par Had done some labs in 2018 and was mentioning to her PCP about family h/o NAS and mother with RA. Patient did not have any symptoms at the time but had an abnormal SSA. She changed her PCP recently and repeated her labs again which was positive.\par \par Has been having some vaginal dryness. In the past 6 months thinks that she may be developing some dry mouth. However, denies any trouble chewing or swallowing. Also very dry skin. No salivary gland or lacrimal gland swelling. No lymphadenopathy. Occasional knee and shoulder pain - gets better with activity. Does not keep her up at night. No morning stiffness but does sometimes wakes up with pain. Has had two episodes of painful mucosal ulcer on the inner aspect of the lip. No neuropathy or weakness. No uveitis symptoms. \par \par H/o ovarian cancer at age 21 - at that time suffered from ascites and also lower abdominal pain with GI symptoms. S/p oophorectomy. Underwent chemotherapy for a few months and was on premarin for a couple years. US abdomen last year for recurrent abdominal pain but was negative. \par \par Mammogram: up to date, PAP: up to date. Never had DEXA\par \par Never been pregnant. \par  [Anorexia] : no anorexia [Weight Loss] : no weight loss [Fever] : no fever [Malaise] : no malaise [Chills] : no chills [Depression] : no depression [Malar Facial Rash] : no malar facial rash [Skin Lesions] : no lesions [Oral Ulcers] : no oral ulcers [Skin Nodules] : no skin nodules [Dysphonia] : no dysphonia [Cough] : no cough [Dysphagia] : no dysphagia [Chest Pain] : no chest pain [Shortness of Breath] : no shortness of breath [Joint Swelling] : no joint swelling [Joint Warmth] : no joint warmth [Morning Stiffness] : no morning stiffness [Joint Deformity] : no joint deformity [Falls] : no falls [Difficulty Standing] : no difficulty standing [Dyspnea] : no dyspnea [Muscle Weakness] : no muscle weakness [Difficulty Walking] : no difficulty walking [Muscle Spasms] : no muscle spasms [Muscle Cramping] : no muscle cramping [Eye Pain] : no eye pain [Visual Changes] : no visual changes [Eye Redness] : no eye redness

## 2020-07-29 NOTE — PHYSICAL EXAM
[General Appearance - In No Acute Distress] : in no acute distress [General Appearance - Alert] : alert [PERRL With Normal Accommodation] : pupils were equal in size, round, and reactive to light [Sclera] : the sclera and conjunctiva were normal [Outer Ear] : the ears and nose were normal in appearance [Extraocular Movements] : extraocular movements were intact [Neck Appearance] : the appearance of the neck was normal [Neck Cervical Mass (___cm)] : no neck mass was observed [Oropharynx] : the oropharynx was normal [Thyroid Nodule] : there were no palpable thyroid nodules [Jugular Venous Distention Increased] : there was no jugular-venous distention [Thyroid Diffuse Enlargement] : the thyroid was not enlarged [Auscultation Breath Sounds / Voice Sounds] : lungs were clear to auscultation bilaterally [] : no respiratory distress [Heart Rate And Rhythm] : heart rate was normal and rhythm regular [Heart Sounds] : normal S1 and S2 [Heart Sounds Gallop] : no gallops [Murmurs] : no murmurs [Heart Sounds Pericardial Friction Rub] : no pericardial rub [Edema] : there was no peripheral edema [Full Pulse] : the pedal pulses are present [Supraclavicular Lymph Nodes Enlarged Bilaterally] : supraclavicular [Cervical Lymph Nodes Enlarged Posterior Bilaterally] : posterior cervical [Cervical Lymph Nodes Enlarged Anterior Bilaterally] : anterior cervical [Axillary Lymph Nodes Enlarged Bilaterally] : axillary [No CVA Tenderness] : no ~M costovertebral angle tenderness [No Spinal Tenderness] : no spinal tenderness [Abnormal Walk] : normal gait [Musculoskeletal - Swelling] : no joint swelling seen [Nail Clubbing] : no clubbing  or cyanosis of the fingernails [Motor Tone] : muscle strength and tone were normal [No Focal Deficits] : no focal deficits [Impaired Insight] : insight and judgment were intact [Oriented To Time, Place, And Person] : oriented to person, place, and time [Affect] : the affect was normal

## 2020-07-29 NOTE — REVIEW OF SYSTEMS
[Feeling Tired] : feeling tired [Dry Eyes] : dryness of the eyes [Joint Pain] : joint pain [As Noted in HPI] : as noted in HPI [Arthralgias] : arthralgias [Joint Stiffness] : joint stiffness [Negative] : Endocrine [Recent Weight Gain (___ Lbs)] : no recent weight gain [Fever] : no fever [Chills] : no chills [Recent Weight Loss (___ Lbs)] : no recent weight loss [Joint Swelling] : no joint swelling [Limb Pain] : no limb pain [Limb Swelling] : no limb swelling [FreeTextEntry4] : dry mouth+

## 2020-07-29 NOTE — ASSESSMENT
[FreeTextEntry1] : 50 year old female with abnormal Sjogren's antibody here to establish care\par \par 1. Abnormal SSA: h/o RA in the family and know SSA positivity for at least 2 years. Has been relatively asymptomatic till now but now noticing some mild dryness in the mouth and eyes as well as arthralgias, fatigue, oral ulcers. Unclear if this is heralding the onset of an autoimmune condition or has some underlying FBM. Discussed signs and symptoms of Sjogren's syndrome in detail including its diagnostic and classification criteria and the difference between the two. Will continue to monitor her closely. May consider Plaquenil depending on labs. \par \par 2. Follow up DEXA at next visit. \par \par Follow up in 2 months

## 2020-07-31 LAB
25(OH)D3 SERPL-MCNC: 24.9 NG/ML
ALBUMIN MFR SERPL ELPH: 57.5 %
ALBUMIN SERPL ELPH-MCNC: 4.5 G/DL
ALBUMIN SERPL-MCNC: 4 G/DL
ALBUMIN/GLOB SERPL: 1.4 RATIO
ALP BLD-CCNC: 78 U/L
ALPHA1 GLOB MFR SERPL ELPH: 4.1 %
ALPHA1 GLOB SERPL ELPH-MCNC: 0.3 G/DL
ALPHA2 GLOB MFR SERPL ELPH: 9.6 %
ALPHA2 GLOB SERPL ELPH-MCNC: 0.7 G/DL
ALT SERPL-CCNC: 16 U/L
ANA SER IF-ACNC: NEGATIVE
ANION GAP SERPL CALC-SCNC: 14 MMOL/L
AST SERPL-CCNC: 20 U/L
B-GLOBULIN MFR SERPL ELPH: 14.3 %
B-GLOBULIN SERPL ELPH-MCNC: 1 G/DL
BASOPHILS # BLD AUTO: 0.04 K/UL
BASOPHILS NFR BLD AUTO: 0.5 %
BILIRUB SERPL-MCNC: 0.3 MG/DL
BUN SERPL-MCNC: 13 MG/DL
C3 SERPL-MCNC: 122 MG/DL
C4 SERPL-MCNC: 41 MG/DL
CALCIUM SERPL-MCNC: 9.8 MG/DL
CCP AB SER IA-ACNC: <8 UNITS
CHLORIDE SERPL-SCNC: 103 MMOL/L
CK SERPL-CCNC: 154 U/L
CO2 SERPL-SCNC: 26 MMOL/L
CREAT SERPL-MCNC: 0.83 MG/DL
DEPRECATED KAPPA LC FREE/LAMBDA SER: 1.54 RATIO
DSDNA AB SER-ACNC: <12 IU/ML
ENA RNP AB SER IA-ACNC: <0.2 AL
ENA SM AB SER IA-ACNC: <0.2 AL
ENA SS-A AB SER IA-ACNC: 5.3 AL
ENA SS-B AB SER IA-ACNC: <0.2 AL
EOSINOPHIL # BLD AUTO: 0.06 K/UL
EOSINOPHIL NFR BLD AUTO: 0.8 %
FERRITIN SERPL-MCNC: 43 NG/ML
GAMMA GLOB FLD ELPH-MCNC: 1 G/DL
GAMMA GLOB MFR SERPL ELPH: 14.5 %
GLUCOSE SERPL-MCNC: 89 MG/DL
HCT VFR BLD CALC: 39.8 %
HGB BLD-MCNC: 12.2 G/DL
IGA SER QL IEP: 333 MG/DL
IGG SER QL IEP: 900 MG/DL
IGM SER QL IEP: 69 MG/DL
IMM GRANULOCYTES NFR BLD AUTO: 0.1 %
INTERPRETATION SERPL IEP-IMP: NORMAL
KAPPA LC CSF-MCNC: 0.84 MG/DL
KAPPA LC SERPL-MCNC: 1.29 MG/DL
LYMPHOCYTES # BLD AUTO: 2.96 K/UL
LYMPHOCYTES NFR BLD AUTO: 39.6 %
M PROTEIN SPEC IFE-MCNC: NORMAL
MAN DIFF?: NORMAL
MCHC RBC-ENTMCNC: 26.6 PG
MCHC RBC-ENTMCNC: 30.7 GM/DL
MCV RBC AUTO: 86.9 FL
MONOCYTES # BLD AUTO: 0.59 K/UL
MONOCYTES NFR BLD AUTO: 7.9 %
MPO AB + PR3 PNL SER: NORMAL
NEUTROPHILS # BLD AUTO: 3.81 K/UL
NEUTROPHILS NFR BLD AUTO: 51.1 %
PLATELET # BLD AUTO: 281 K/UL
POTASSIUM SERPL-SCNC: 5.4 MMOL/L
PROT SERPL-MCNC: 6.9 G/DL
PROT SERPL-MCNC: 6.9 G/DL
PROT SERPL-MCNC: 7.1 G/DL
RBC # BLD: 4.58 M/UL
RBC # FLD: 15.7 %
RF+CCP IGG SER-IMP: NEGATIVE
RHEUMATOID FACT SER QL: 10 IU/ML
SODIUM SERPL-SCNC: 142 MMOL/L
THYROGLOB AB SERPL-ACNC: <20 IU/ML
THYROPEROXIDASE AB SERPL IA-ACNC: 16.8 IU/ML
WBC # FLD AUTO: 7.47 K/UL

## 2020-08-03 LAB — G6PD SER-CCNC: 17 U/G HGB

## 2020-08-17 LAB
EJ AB SER QL: NEGATIVE
ENA JO1 AB SER IA-ACNC: <20 UNITS
ENA PM/SCL AB SER-ACNC: <20 UNITS
ENA SM+RNP AB SER IA-ACNC: <20 UNITS
ENA SS-A IGG SER QL: <20 UNITS
FIBRILLARIN AB SER QL: NEGATIVE
KU AB SER QL: NEGATIVE
MDA-5 (P140)(CADM-140): <20 UNITS
MI2 AB SER QL: NEGATIVE
NXP-2 (P140): <20 UNITS
OJ AB SER QL: NEGATIVE
PL12 AB SER QL: NEGATIVE
PL7 AB SER QL: NEGATIVE
SRP AB SERPL QL: NEGATIVE
TIF GAMMA (P155/140): <20 UNITS
U2 SNRNP AB SER QL: NEGATIVE

## 2022-05-20 ENCOUNTER — RESULT REVIEW (OUTPATIENT)
Age: 52
End: 2022-05-20

## 2022-09-15 NOTE — ED ADULT TRIAGE NOTE - PAIN: PRESENCE, MLM
Otolaryngologist Procedure Text (A): After obtaining clear surgical margins the patient was sent to otolaryngology for surgical repair.  The patient understands they will receive post-surgical care and follow-up from the referring physician's office. complains of pain/discomfort

## 2025-01-14 ENCOUNTER — EMERGENCY (EMERGENCY)
Facility: HOSPITAL | Age: 55
LOS: 0 days | Discharge: HOME | End: 2025-01-15
Attending: STUDENT IN AN ORGANIZED HEALTH CARE EDUCATION/TRAINING PROGRAM
Payer: COMMERCIAL

## 2025-01-14 VITALS
HEART RATE: 107 BPM | OXYGEN SATURATION: 94 % | WEIGHT: 184.97 LBS | DIASTOLIC BLOOD PRESSURE: 67 MMHG | RESPIRATION RATE: 19 BRPM | HEIGHT: 69 IN | SYSTOLIC BLOOD PRESSURE: 94 MMHG | TEMPERATURE: 99 F

## 2025-01-14 DIAGNOSIS — R51.9 HEADACHE, UNSPECIFIED: ICD-10-CM

## 2025-01-14 DIAGNOSIS — M79.10 MYALGIA, UNSPECIFIED SITE: ICD-10-CM

## 2025-01-14 DIAGNOSIS — J15.7 PNEUMONIA DUE TO MYCOPLASMA PNEUMONIAE: ICD-10-CM

## 2025-01-14 DIAGNOSIS — R05.1 ACUTE COUGH: ICD-10-CM

## 2025-01-14 LAB
ALBUMIN SERPL ELPH-MCNC: 2.9 G/DL — LOW (ref 3.3–5)
ALP SERPL-CCNC: 71 U/L — SIGNIFICANT CHANGE UP (ref 40–120)
ALT FLD-CCNC: 17 U/L — SIGNIFICANT CHANGE UP (ref 12–78)
ANION GAP SERPL CALC-SCNC: 7 MMOL/L — SIGNIFICANT CHANGE UP (ref 5–17)
APTT BLD: 34.9 SEC — SIGNIFICANT CHANGE UP (ref 24.5–35.6)
AST SERPL-CCNC: 18 U/L — SIGNIFICANT CHANGE UP (ref 15–37)
BASOPHILS # BLD AUTO: 0.05 K/UL — SIGNIFICANT CHANGE UP (ref 0–0.2)
BASOPHILS NFR BLD AUTO: 0.4 % — SIGNIFICANT CHANGE UP (ref 0–2)
BILIRUB SERPL-MCNC: 0.3 MG/DL — SIGNIFICANT CHANGE UP (ref 0.2–1.2)
BUN SERPL-MCNC: 15 MG/DL — SIGNIFICANT CHANGE UP (ref 7–23)
CALCIUM SERPL-MCNC: 8.6 MG/DL — SIGNIFICANT CHANGE UP (ref 8.5–10.1)
CHLORIDE SERPL-SCNC: 99 MMOL/L — SIGNIFICANT CHANGE UP (ref 96–108)
CO2 SERPL-SCNC: 30 MMOL/L — SIGNIFICANT CHANGE UP (ref 22–31)
CREAT SERPL-MCNC: 0.92 MG/DL — SIGNIFICANT CHANGE UP (ref 0.5–1.3)
EGFR: 75 ML/MIN/1.73M2 — SIGNIFICANT CHANGE UP
EOSINOPHIL # BLD AUTO: 0.58 K/UL — HIGH (ref 0–0.5)
EOSINOPHIL NFR BLD AUTO: 4.3 % — SIGNIFICANT CHANGE UP (ref 0–6)
FLUAV AG NPH QL: SIGNIFICANT CHANGE UP
FLUBV AG NPH QL: SIGNIFICANT CHANGE UP
GLUCOSE SERPL-MCNC: 114 MG/DL — HIGH (ref 70–99)
HCT VFR BLD CALC: 39.3 % — SIGNIFICANT CHANGE UP (ref 34.5–45)
HGB BLD-MCNC: 13.5 G/DL — SIGNIFICANT CHANGE UP (ref 11.5–15.5)
IMM GRANULOCYTES NFR BLD AUTO: 0.2 % — SIGNIFICANT CHANGE UP (ref 0–0.9)
INR BLD: 1.13 RATIO — SIGNIFICANT CHANGE UP (ref 0.85–1.16)
LACTATE SERPL-SCNC: 1.3 MMOL/L — SIGNIFICANT CHANGE UP (ref 0.7–2)
LYMPHOCYTES # BLD AUTO: 15.9 % — SIGNIFICANT CHANGE UP (ref 13–44)
LYMPHOCYTES # BLD AUTO: 2.14 K/UL — SIGNIFICANT CHANGE UP (ref 1–3.3)
MCHC RBC-ENTMCNC: 28.3 PG — SIGNIFICANT CHANGE UP (ref 27–34)
MCHC RBC-ENTMCNC: 34.4 G/DL — SIGNIFICANT CHANGE UP (ref 32–36)
MCV RBC AUTO: 82.4 FL — SIGNIFICANT CHANGE UP (ref 80–100)
MONOCYTES # BLD AUTO: 1.38 K/UL — HIGH (ref 0–0.9)
MONOCYTES NFR BLD AUTO: 10.3 % — SIGNIFICANT CHANGE UP (ref 2–14)
NEUTROPHILS # BLD AUTO: 9.27 K/UL — HIGH (ref 1.8–7.4)
NEUTROPHILS NFR BLD AUTO: 68.9 % — SIGNIFICANT CHANGE UP (ref 43–77)
NRBC # BLD: 0 /100 WBCS — SIGNIFICANT CHANGE UP (ref 0–0)
PLATELET # BLD AUTO: 243 K/UL — SIGNIFICANT CHANGE UP (ref 150–400)
POTASSIUM SERPL-MCNC: 3.3 MMOL/L — LOW (ref 3.5–5.3)
POTASSIUM SERPL-SCNC: 3.3 MMOL/L — LOW (ref 3.5–5.3)
PROT SERPL-MCNC: 7.9 GM/DL — SIGNIFICANT CHANGE UP (ref 6–8.3)
PROTHROM AB SERPL-ACNC: 12.7 SEC — SIGNIFICANT CHANGE UP (ref 9.9–13.4)
RBC # BLD: 4.77 M/UL — SIGNIFICANT CHANGE UP (ref 3.8–5.2)
RBC # FLD: 15.1 % — HIGH (ref 10.3–14.5)
RSV RNA NPH QL NAA+NON-PROBE: SIGNIFICANT CHANGE UP
SARS-COV-2 RNA SPEC QL NAA+PROBE: SIGNIFICANT CHANGE UP
SODIUM SERPL-SCNC: 136 MMOL/L — SIGNIFICANT CHANGE UP (ref 135–145)
WBC # BLD: 13.45 K/UL — HIGH (ref 3.8–10.5)
WBC # FLD AUTO: 13.45 K/UL — HIGH (ref 3.8–10.5)

## 2025-01-14 PROCEDURE — 71046 X-RAY EXAM CHEST 2 VIEWS: CPT | Mod: 26

## 2025-01-14 PROCEDURE — 99285 EMERGENCY DEPT VISIT HI MDM: CPT

## 2025-01-14 PROCEDURE — 70450 CT HEAD/BRAIN W/O DYE: CPT | Mod: 26

## 2025-01-14 RX ORDER — SODIUM CHLORIDE 9 MG/ML
2000 INJECTION, SOLUTION INTRAMUSCULAR; INTRAVENOUS; SUBCUTANEOUS ONCE
Refills: 0 | Status: COMPLETED | OUTPATIENT
Start: 2025-01-14 | End: 2025-01-14

## 2025-01-14 RX ORDER — ACETAMINOPHEN 80 MG/.8ML
1000 SOLUTION/ DROPS ORAL ONCE
Refills: 0 | Status: COMPLETED | OUTPATIENT
Start: 2025-01-14 | End: 2025-01-14

## 2025-01-14 RX ORDER — CEFTRIAXONE SODIUM 1 G/1
1000 INJECTION, POWDER, FOR SOLUTION INTRAMUSCULAR; INTRAVENOUS ONCE
Refills: 0 | Status: COMPLETED | OUTPATIENT
Start: 2025-01-14 | End: 2025-01-14

## 2025-01-14 RX ORDER — METOCLOPRAMIDE 10 MG/1
10 TABLET ORAL ONCE
Refills: 0 | Status: COMPLETED | OUTPATIENT
Start: 2025-01-14 | End: 2025-01-14

## 2025-01-14 RX ORDER — KETOROLAC TROMETHAMINE 30 MG/ML
15 INJECTION INTRAMUSCULAR; INTRAVENOUS ONCE
Refills: 0 | Status: DISCONTINUED | OUTPATIENT
Start: 2025-01-14 | End: 2025-01-14

## 2025-01-14 RX ORDER — AZITHROMYCIN MONOHYDRATE 200 MG/5ML
500 POWDER, FOR SUSPENSION ORAL ONCE
Refills: 0 | Status: COMPLETED | OUTPATIENT
Start: 2025-01-14 | End: 2025-01-14

## 2025-01-14 RX ADMIN — SODIUM CHLORIDE 2000 MILLILITER(S): 9 INJECTION, SOLUTION INTRAMUSCULAR; INTRAVENOUS; SUBCUTANEOUS at 22:13

## 2025-01-14 RX ADMIN — ACETAMINOPHEN 400 MILLIGRAM(S): 80 SOLUTION/ DROPS ORAL at 22:14

## 2025-01-14 RX ADMIN — METOCLOPRAMIDE 10 MILLIGRAM(S): 10 TABLET ORAL at 22:15

## 2025-01-14 NOTE — ED PROVIDER NOTE - DATE/TIME 1
chest wall non-tender, breathing is unlabored without accessory muscle use, normal breath sounds
15-Paul-2025 01:50

## 2025-01-14 NOTE — ED PROVIDER NOTE - PHYSICAL EXAMINATION
Gen: aox3, NAD   Head: NCAT  ENT: Airway patent, moist mucous membranes, nasal passageways clear, no pharyngeal erythema or exudates, uvula midline, no cervical lymphadenopathy, neck supple  Cardiac: Normal rate, normal rhythm   Respiratory: mildly diminished breath sounds right base   Gastrointestinal: Abdomen soft, nontender, nondistended, no rebound, no guarding  MSK: No gross abnormalities, FROM of all four extremities, no edema  HEME: Extremities warm, pulses intact and symmetrical in all four extremities  Skin: No rashes, no lesions  Neuro: No gross neurologic deficits, CN II-XII intact, no facial asymmetry, no dysarthria, no dysmetria, no drift, strength equal in all four extremities, no gait abnormality, no nuchal rigidity

## 2025-01-14 NOTE — ED PROVIDER NOTE - PROGRESS NOTE DETAILS
Daniel DO: pt reassessed, clinically improved, feels better, found to have RLL pna and mycoplasma +, abx and return precautions discussed at length with pt

## 2025-01-14 NOTE — ED PROVIDER NOTE - CLINICAL SUMMARY MEDICAL DECISION MAKING FREE TEXT BOX
54F no pertinent pmhx who presents for evaluation of fevers, myalgias, cough, headache, duration x 4-5 days, reports tmax 103F, states took tylenol 1g at 430pm and ibuprofen 400mg at 430pm. Denies vomiting. Notes nausea and decreased appetite. Denies sick contacts or travel. Denies abd pain or vaginal discharge  physical exam as above  eval for pna, eval for viral syndrome, labs to assess fo leukocytosis/metabolic derangements, no nuchal rigidity, IV fluids, sepsis w/u and re-eval. 54F no pertinent pmhx who presents for evaluation of fevers, myalgias, cough, headache, duration x 4-5 days, reports tmax 103F, states took tylenol 1g at 430pm and ibuprofen 400mg at 430pm. Denies vomiting. Notes nausea and decreased appetite. Denies sick contacts or travel. Denies abd pain or vaginal discharge  physical exam as above  eval for pna, eval for viral syndrome, labs to assess fo leukocytosis/metabolic derangements, no nuchal rigidity, IV fluids, sepsis w/u and re-eval. has headache but also cough, clinical exam with neck supple, no neck stiffness/pain or nuchal rigidity, no signs of meningismus,  - found to have pna, improved, vitals wnl, stable for dc with strict return precautions and abx

## 2025-01-14 NOTE — ED ADULT TRIAGE NOTE - CHIEF COMPLAINT QUOTE
Patient presents to ED c/o intermittent fever, lethargic and severe headaches since Friday. Took tylenol and Motrin with partial relief.  no pmh

## 2025-01-14 NOTE — ED ADULT NURSE NOTE - OBJECTIVE STATEMENT
54 y.o female, A&Ox4, c/o flu like symptoms. As per pt, she has been having fevers, headache, cough since Friday. pt states highest fever she had was 103. pt states she has to take the Tylenol and ibuprofen around the clock to keep the fever down. pt states headache, and cough, and mild chest pain. pt ambulatory with steady gait. 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 pt states she tested herself for flu and covid and states she was negative.

## 2025-01-14 NOTE — ED PROVIDER NOTE - PATIENT PORTAL LINK FT
You can access the FollowMyHealth Patient Portal offered by [facility] by registering at the following website: [email or web address]. By joining [facility]’s FollowMyHealth portal, you will also be able to view your health information using other applications (apps) compatible with our system.

## 2025-01-14 NOTE — ED ADULT NURSE NOTE - NSFALLASSESSNEED_ED_ALL_ED
Goals: Food log (ie ) www [email or web address] [email or web address]  [email or web address],[email or web address],calorieking  com,etc    No sugary beverages  At least 64oz of water daily  Increase physical activity by 10 minutes daily   Gradually increase physical activity to a goal of 5 days per week for 30 minutes of MODERATE intensity PLUS 2 days per week of FULL BODY resistance training  70-80 grams of protein per day  20-25 grams of fiber per day no

## 2025-01-14 NOTE — ED PROVIDER NOTE - NSFOLLOWUPINSTRUCTIONS_ED_ALL_ED_FT
You were seen today for persistent fever and you were found to be positive for mycoplasma pneumonia and found to have right lower lung pneumonia on xray - if there is a discrepancy in the xray report than we will call you     Continue antibiotics - you were given a dose of ceftriaxone here and azithromycin. Start cefpodoxime 200mg every 12 hours for total 7 days and tomorrow night take azithromycin 250mg tablet once a day for 4 more days for a total 5 day course.     Return if fevers or headache are not improving or if you develop chest pain, weakness, shortness of breath or new concerns    We will call you if blood cultures result abnormal      Pneumonia    Pneumonia is an infection of the lungs. Pneumonia may be caused by bacteria, viruses, or funguses. Symptoms include coughing, fever, chest pain when breathing deeply or coughing, shortness of breath, fatigue, or muscle aches. Pneumonia can be diagnosed with a medical history and physical exam, as well as other tests which may include a chest X-ray. If you were prescribed an antibiotic medicine, take it as told by your health care provider and do not stop taking the antibiotic even if you start to feel better. Do not use tobacco products, including cigarettes, chewing tobacco, and e-cigarettes.    SEEK IMMEDIATE MEDICAL CARE IF YOU HAVE ANY OF THE FOLLOWING SYMPTOMS: worsening shortness of breath, worsening chest pain, coughing up blood, change in mental status, lightheadedness/dizziness.

## 2025-01-15 VITALS
SYSTOLIC BLOOD PRESSURE: 113 MMHG | DIASTOLIC BLOOD PRESSURE: 70 MMHG | TEMPERATURE: 100 F | OXYGEN SATURATION: 98 % | RESPIRATION RATE: 18 BRPM | HEART RATE: 84 BPM

## 2025-01-15 LAB
APPEARANCE UR: CLEAR — SIGNIFICANT CHANGE UP
B PERT DNA SPEC QL NAA+PROBE: DETECTED
BACTERIA # UR AUTO: ABNORMAL /HPF
BILIRUB UR-MCNC: NEGATIVE — SIGNIFICANT CHANGE UP
COLOR SPEC: YELLOW — SIGNIFICANT CHANGE UP
DIFF PNL FLD: NEGATIVE — SIGNIFICANT CHANGE UP
EPI CELLS # UR: PRESENT
GLUCOSE UR QL: NEGATIVE MG/DL — SIGNIFICANT CHANGE UP
KETONES UR-MCNC: ABNORMAL MG/DL
LEUKOCYTE ESTERASE UR-ACNC: ABNORMAL
NITRITE UR-MCNC: NEGATIVE — SIGNIFICANT CHANGE UP
PH UR: 6 — SIGNIFICANT CHANGE UP (ref 5–8)
PROT UR-MCNC: 30 MG/DL
RAPID RVP RESULT: DETECTED
RBC CASTS # UR COMP ASSIST: 2 /HPF — SIGNIFICANT CHANGE UP (ref 0–4)
SARS-COV-2 RNA SPEC QL NAA+PROBE: SIGNIFICANT CHANGE UP
SP GR SPEC: 1.02 — SIGNIFICANT CHANGE UP (ref 1–1.03)
UROBILINOGEN FLD QL: 0.2 MG/DL — SIGNIFICANT CHANGE UP (ref 0.2–1)
WBC UR QL: 1 /HPF — SIGNIFICANT CHANGE UP (ref 0–5)

## 2025-01-15 RX ORDER — AZITHROMYCIN MONOHYDRATE 200 MG/5ML
1 POWDER, FOR SUSPENSION ORAL
Qty: 4 | Refills: 0
Start: 2025-01-15 | End: 2025-01-18

## 2025-01-15 RX ORDER — CEFPODOXIME PROXETIL 100 MG/5ML
1 GRANULE, FOR SUSPENSION ORAL
Qty: 14 | Refills: 0
Start: 2025-01-15 | End: 2025-01-21

## 2025-01-15 RX ADMIN — CEFTRIAXONE SODIUM 100 MILLIGRAM(S): 1 INJECTION, POWDER, FOR SOLUTION INTRAMUSCULAR; INTRAVENOUS at 00:00

## 2025-01-15 RX ADMIN — AZITHROMYCIN MONOHYDRATE 255 MILLIGRAM(S): 200 POWDER, FOR SUSPENSION ORAL at 00:38

## 2025-01-16 LAB
CULTURE RESULTS: SIGNIFICANT CHANGE UP
SPECIMEN SOURCE: SIGNIFICANT CHANGE UP
